# Patient Record
Sex: MALE | Race: OTHER | HISPANIC OR LATINO | Employment: FULL TIME | URBAN - METROPOLITAN AREA
[De-identification: names, ages, dates, MRNs, and addresses within clinical notes are randomized per-mention and may not be internally consistent; named-entity substitution may affect disease eponyms.]

---

## 2018-07-19 ENCOUNTER — APPOINTMENT (EMERGENCY)
Dept: RADIOLOGY | Facility: HOSPITAL | Age: 33
End: 2018-07-19
Payer: OTHER MISCELLANEOUS

## 2018-07-19 ENCOUNTER — HOSPITAL ENCOUNTER (EMERGENCY)
Facility: HOSPITAL | Age: 33
Discharge: HOME | End: 2018-07-20
Attending: EMERGENCY MEDICINE | Admitting: EMERGENCY MEDICINE
Payer: OTHER MISCELLANEOUS

## 2018-07-19 VITALS
TEMPERATURE: 98 F | HEART RATE: 93 BPM | WEIGHT: 310 LBS | OXYGEN SATURATION: 96 % | SYSTOLIC BLOOD PRESSURE: 139 MMHG | DIASTOLIC BLOOD PRESSURE: 87 MMHG | RESPIRATION RATE: 18 BRPM

## 2018-07-19 DIAGNOSIS — Z48.02 VISIT FOR SUTURE REMOVAL: ICD-10-CM

## 2018-07-19 DIAGNOSIS — Z51.89 VISIT FOR WOUND CHECK: Primary | ICD-10-CM

## 2018-07-19 LAB
ANION GAP SERPL CALCULATED.3IONS-SCNC: 3 MMOL/L (ref 4–13)
BASOPHILS # BLD AUTO: 0.05 THOUSANDS/ΜL (ref 0–0.1)
BASOPHILS NFR BLD AUTO: 1 % (ref 0–1)
BUN SERPL-MCNC: 11 MG/DL (ref 5–25)
CALCIUM SERPL-MCNC: 8.6 MG/DL (ref 8.3–10.1)
CHLORIDE SERPL-SCNC: 109 MMOL/L (ref 100–108)
CO2 SERPL-SCNC: 27 MMOL/L (ref 21–32)
CREAT SERPL-MCNC: 1.2 MG/DL (ref 0.6–1.3)
EOSINOPHIL # BLD AUTO: 0.18 THOUSAND/ΜL (ref 0–0.61)
EOSINOPHIL NFR BLD AUTO: 2 % (ref 0–6)
ERYTHROCYTE [DISTWIDTH] IN BLOOD BY AUTOMATED COUNT: 12.8 % (ref 11.6–15.1)
GFR SERPL CREATININE-BSD FRML MDRD: 80 ML/MIN/1.73SQ M
GLUCOSE SERPL-MCNC: 132 MG/DL (ref 65–140)
HCT VFR BLD AUTO: 45.3 % (ref 36.5–49.3)
HGB BLD-MCNC: 15.2 G/DL (ref 12–17)
IMM GRANULOCYTES # BLD AUTO: 0.08 THOUSAND/UL (ref 0–0.2)
IMM GRANULOCYTES NFR BLD AUTO: 1 % (ref 0–2)
LYMPHOCYTES # BLD AUTO: 3.66 THOUSANDS/ΜL (ref 0.6–4.47)
LYMPHOCYTES NFR BLD AUTO: 45 % (ref 14–44)
MCH RBC QN AUTO: 31.1 PG (ref 26.8–34.3)
MCHC RBC AUTO-ENTMCNC: 33.6 G/DL (ref 31.4–37.4)
MCV RBC AUTO: 93 FL (ref 82–98)
MONOCYTES # BLD AUTO: 0.61 THOUSAND/ΜL (ref 0.17–1.22)
MONOCYTES NFR BLD AUTO: 7 % (ref 4–12)
NEUTROPHILS # BLD AUTO: 3.63 THOUSANDS/ΜL (ref 1.85–7.62)
NEUTS SEG NFR BLD AUTO: 44 % (ref 43–75)
NRBC BLD AUTO-RTO: 0 /100 WBCS
PLATELET # BLD AUTO: 352 THOUSANDS/UL (ref 149–390)
PMV BLD AUTO: 9.9 FL (ref 8.9–12.7)
POTASSIUM SERPL-SCNC: 4.7 MMOL/L (ref 3.5–5.3)
RBC # BLD AUTO: 4.89 MILLION/UL (ref 3.88–5.62)
SODIUM SERPL-SCNC: 139 MMOL/L (ref 136–145)
WBC # BLD AUTO: 8.21 THOUSAND/UL (ref 4.31–10.16)

## 2018-07-19 PROCEDURE — 73201 CT UPPER EXTREMITY W/DYE: CPT

## 2018-07-19 PROCEDURE — 80048 BASIC METABOLIC PNL TOTAL CA: CPT | Performed by: EMERGENCY MEDICINE

## 2018-07-19 PROCEDURE — 85025 COMPLETE CBC W/AUTO DIFF WBC: CPT | Performed by: EMERGENCY MEDICINE

## 2018-07-19 PROCEDURE — 36415 COLL VENOUS BLD VENIPUNCTURE: CPT | Performed by: EMERGENCY MEDICINE

## 2018-07-19 RX ORDER — OXYCODONE HYDROCHLORIDE AND ACETAMINOPHEN 5; 325 MG/1; MG/1
1 TABLET ORAL ONCE
Status: COMPLETED | OUTPATIENT
Start: 2018-07-19 | End: 2018-07-19

## 2018-07-19 RX ADMIN — OXYCODONE HYDROCHLORIDE AND ACETAMINOPHEN 1 TABLET: 5; 325 TABLET ORAL at 23:09

## 2018-07-19 RX ADMIN — IOHEXOL 100 ML: 350 INJECTION, SOLUTION INTRAVENOUS at 23:59

## 2018-07-20 PROCEDURE — 99284 EMERGENCY DEPT VISIT MOD MDM: CPT

## 2018-07-20 NOTE — DISCHARGE INSTRUCTIONS
Follow-up with the primary care doctor in 24-48 hours for re-evaluation  Return with worsening pain, fevers any drainage or bleeding from the incision site  Today your sutures were removed as it had been 9 days since that replaced  Continue with antibiotics for the complete duration of previous recommendations  Continue Tylenol and ibuprofen for any mild to moderate discomfort  Laceration   WHAT YOU NEED TO KNOW:   A laceration is an injury to the skin and the soft tissue underneath it  Lacerations happen when you are cut or hit by something  They can happen anywhere on the body  DISCHARGE INSTRUCTIONS:   Return to the emergency department if:   · You have heavy bleeding or bleeding that does not stop after 10 minutes of holding firm, direct pressure over the wound       · Your wound opens up  Contact your healthcare provider if:   · You have a fever or chills       · Your laceration is red, warm, or swollen      · You have red streaks on your skin coming from your wound      · You have white or yellow drainage from the wound that smells bad      · You have pain that gets worse, even after treatment       · You have questions or concerns about your condition or care  Medicines:   · Prescription pain medicine may be given  Ask how to take this medicine safely       · Antibiotics help treat or prevent a bacterial infection       · Take your medicine as directed  Contact your healthcare provider if you think your medicine is not helping or if you have side effects  Tell him or her if you are allergic to any medicine  Keep a list of the medicines, vitamins, and herbs you take  Include the amounts, and when and why you take them  Bring the list or the pill bottles to follow-up visits  Carry your medicine list with you in case of an emergency  Care for your wound as directed:   · Do not get your wound wet until your healthcare provider says it is okay  Do not soak your wound in water   Do not go swimming until your healthcare provider says it is okay  Carefully wash the wound with soap and water  Gently pat the area dry or allow it to air dry       · Change your bandages when they get wet, dirty, or after washing  Apply new, clean bandages as directed  Do not apply elastic bandages or tape too tight  Do not put powders or lotions over your incision       · Apply antibiotic ointment as directed  Your healthcare provider may give you antibiotic ointment to put over your wound if you have stitches  If you have strips of tape over your incision, let them dry up and fall off on their own  If they do not fall off within 14 days, gently remove them  If you have glue over your wound, do not remove or pick at it  If your glue comes off, do not replace it with glue that you have at home       · Check your wound every day for signs of infection such as swelling, redness, or pus  Self-care:   · Apply ice on your wound for 15 to 20 minutes every hour or as directed  Use an ice pack, or put crushed ice in a plastic bag  Cover it with a towel  Ice helps prevent tissue damage and decreases swelling and pain      · Use a splint as directed  A splint will decrease movement and stress on your wound  It may help it heal faster  A splint may be used for lacerations over joints or areas of your body that bend  Ask your healthcare provider how to apply and remove a splint       · Decrease scarring of your wound by applying ointments as directed  Do not apply ointments until your healthcare provider says it is okay  You may need to wait until your wound is healed  Ask which ointment to buy and how often to use it  After your wound is healed, use sunscreen over the area when you are out in the sun  You should do this for at least 6 months to 1 year after your injury  Follow up with your healthcare provider as directed: You may need to follow up in 24 to 48 hours to have your wound checked for infection   You will need to return in 3 to 14 days if you have stitches or staples so they can be removed  Care for your wound as directed to prevent infection and help it heal  Write down your questions so you remember to ask them during your visits  © 2017 [employer] Information is for End User's use only and may not be sold, redistributed or otherwise used for commercial purposes  All illustrations and images included in CareNotes® are the copyrighted property of A D [employer] M [employer] Inc  or Oleg Pierce  The above information is an  only  It is not intended as medical advice for individual conditions or treatments   Talk to your doctor, nurse or pharmacist before following any medical regimen to see if it is safe and effective for you
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
## 2018-07-20 NOTE — ED ATTENDING ATTESTATION
Lauren Jacob MD, saw and evaluated the patient  I have discussed the patient with the resident/non-physician practitioner and agree with the resident's/non-physician practitioner's findings, Plan of Care, and MDM as documented in the resident's/non-physician practitioner's note, except where noted  All available labs and Radiology studies were reviewed  At this point I agree with the current assessment done in the Emergency Department  I have conducted an independent evaluation of this patient a history and physical is as follows:      Critical Care Time  CritCare Time    Procedures     35 yo male with 5 cm laceration from  on right forearm  Pt given abx  Pt with increased pain in forearm with erythema around incision  No fever  No pmh  Vss, afebrile, lungs cta, rrr, incision c/d/i, full rom and tenderness with palpation, pain out of proportion to exam   Labs, ct right arm, pain meds

## 2018-10-10 ENCOUNTER — APPOINTMENT (OUTPATIENT)
Dept: URGENT CARE | Age: 33
End: 2018-10-10
Payer: OTHER MISCELLANEOUS

## 2018-10-10 PROCEDURE — 99204 OFFICE O/P NEW MOD 45 MIN: CPT | Performed by: PREVENTIVE MEDICINE

## 2019-09-07 ENCOUNTER — APPOINTMENT (EMERGENCY)
Dept: RADIOLOGY | Facility: HOSPITAL | Age: 34
End: 2019-09-07
Payer: OTHER MISCELLANEOUS

## 2019-09-07 ENCOUNTER — HOSPITAL ENCOUNTER (EMERGENCY)
Facility: HOSPITAL | Age: 34
Discharge: HOME | End: 2019-09-08
Attending: EMERGENCY MEDICINE
Payer: OTHER MISCELLANEOUS

## 2019-09-07 VITALS
HEART RATE: 64 BPM | TEMPERATURE: 96.3 F | DIASTOLIC BLOOD PRESSURE: 69 MMHG | RESPIRATION RATE: 18 BRPM | OXYGEN SATURATION: 96 % | SYSTOLIC BLOOD PRESSURE: 111 MMHG

## 2019-09-07 DIAGNOSIS — S62.322A CLOSED DISPLACED FRACTURE OF SHAFT OF THIRD METACARPAL BONE OF RIGHT HAND, INITIAL ENCOUNTER: Primary | ICD-10-CM

## 2019-09-07 PROCEDURE — 99283 EMERGENCY DEPT VISIT LOW MDM: CPT | Performed by: EMERGENCY MEDICINE

## 2019-09-07 PROCEDURE — 73130 X-RAY EXAM OF HAND: CPT

## 2019-09-07 PROCEDURE — 99283 EMERGENCY DEPT VISIT LOW MDM: CPT

## 2019-09-07 RX ORDER — OXYCODONE HYDROCHLORIDE AND ACETAMINOPHEN 5; 325 MG/1; MG/1
1 TABLET ORAL ONCE
Status: COMPLETED | OUTPATIENT
Start: 2019-09-07 | End: 2019-09-07

## 2019-09-07 RX ADMIN — OXYCODONE HYDROCHLORIDE AND ACETAMINOPHEN 1 TABLET: 5; 325 TABLET ORAL at 23:46

## 2019-09-08 RX ORDER — NAPROXEN 500 MG/1
500 TABLET ORAL 2 TIMES DAILY WITH MEALS
Qty: 30 TABLET | Refills: 0 | Status: SHIPPED | OUTPATIENT
Start: 2019-09-08

## 2019-09-08 RX ORDER — OXYCODONE HYDROCHLORIDE AND ACETAMINOPHEN 5; 325 MG/1; MG/1
1 TABLET ORAL EVERY 4 HOURS PRN
Qty: 12 TABLET | Refills: 0 | Status: SHIPPED | OUTPATIENT
Start: 2019-09-08 | End: 2019-09-18
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
## 2019-09-10 NOTE — TELEPHONE ENCOUNTER
Call from patient   Call back # [phone]  Workers comp     I sent a message to Shy for an appt  Marine Somers

## 2019-09-10 NOTE — TELEPHONE ENCOUNTER
I called patient and left a voicemail to inform them of their appointment with Dr Lance Reynolds on Friday 9/13/19 at 1:45pm in our [place] location, Entrance A  When/if the patient calls back, please relay the message above  If this time and date does not work for the patient, please email Teto Elaine       Thank you

## 2019-09-16 ENCOUNTER — HOSPITAL ENCOUNTER (OUTPATIENT)
Dept: RADIOLOGY | Facility: HOSPITAL | Age: 34
Discharge: HOME | End: 2019-09-16
Payer: OTHER MISCELLANEOUS

## 2019-09-16 ENCOUNTER — OFFICE VISIT (OUTPATIENT)
Dept: OBGYN CLINIC | Facility: HOSPITAL | Age: 34
End: 2019-09-16
Payer: OTHER MISCELLANEOUS

## 2019-09-16 VITALS
DIASTOLIC BLOOD PRESSURE: 112 MMHG | HEART RATE: 62 BPM | SYSTOLIC BLOOD PRESSURE: 163 MMHG | WEIGHT: 279 LBS | BODY MASS INDEX: 39.06 KG/M2 | HEIGHT: 71 IN

## 2019-09-16 DIAGNOSIS — M79.641 RIGHT HAND PAIN: ICD-10-CM

## 2019-09-16 DIAGNOSIS — M79.641 RIGHT HAND PAIN: Primary | ICD-10-CM

## 2019-09-16 DIAGNOSIS — S62.322A CLOSED DISPLACED FRACTURE OF SHAFT OF THIRD METACARPAL BONE OF RIGHT HAND, INITIAL ENCOUNTER: ICD-10-CM

## 2019-09-16 PROCEDURE — 73130 X-RAY EXAM OF HAND: CPT

## 2019-09-16 PROCEDURE — 99203 OFFICE O/P NEW LOW 30 MIN: CPT | Performed by: PHYSICIAN ASSISTANT
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
## 2019-09-16 NOTE — PATIENT INSTRUCTIONS
Ice Pack Application   WHAT YOU NEED TO KNOW:   Ice can be used to decrease swelling and pain after an injury or surgery  Common injuries that may benefit from ice therapy are sprains, strains, and bruises  The use of ice is most effective in the first 1 to 3 days after an injury  DISCHARGE INSTRUCTIONS:   How to apply ice:   · Fill a bag with crushed ice about half full  Remove the air from the bag before you close it  You can also use a bag of frozen vegetables  · Wrap the ice pack in a cloth to protect your skin from frostbite or other injury  · Put the ice over the injured area for 20 to 30 minutes or as long as directed  · Check your skin after about 30 seconds for color changes or blistering  Remove the ice if you notice skin changes or you feel burning or numbness in the area  · Throw the ice pack away after use  · Apply ice to your injured area 4 times each day or as directed  Ask your healthcare provider how many days you should apply ice  Contact your healthcare provider if:   · You see blisters, whitening of your skin, or a bluish color to your skin after using ice  · You feel burning or numbness when using ice  · You have questions about the use of ice packs  © 2017 [address] [facility] Information is for End User's use only and may not be sold, redistributed or otherwise used for commercial purposes  All illustrations and images included in CareNotes® are the copyrighted property of [employer]  or Oleg Pierce  The above information is an  only  It is not intended as medical advice for individual conditions or treatments  Talk to your doctor, nurse or pharmacist before following any medical regimen to see if it is safe and effective for you  Safe Use of NSAIDs   WHAT YOU NEED TO KNOW:   NSAIDs are medicines that are used to decrease pain, swelling, and fever  NSAIDs are available with or without a doctor's order   NSAIDs that you can buy without a doctor's order include aspirin, ibuprofen, and naproxen  DISCHARGE INSTRUCTIONS:   Return to the emergency department if:   · You have swelling around your mouth or trouble breathing  · You are breathing fast or you have a fast heartbeat  · You have nausea, vomiting, or abdominal pain  · You have blood in your vomit or bowel movements  · You have a seizure  Contact your healthcare provider if:   · You have a headache or become confused  · You develop hearing loss or ringing in your ears  · You develop itching, a rash, or hives  · You have swelling around your lower legs, feet, ankles, and hands  · You do not know how much NSAIDs to give to your child  · You have questions or concerns about your condition or care  How to give NSAIDs to your child safely:   · Read the directions on the label  Find out if the medicine is right for your child's age and how much to give to your child  The dose for your child's weight or age should be listed  Do not  give your child more than the recommended amount  · Use the measuring tool that came with the medicine  Do not  use another measuring tool, such as a kitchen spoon  Other measuring tools do not provide the right amount of medicine  How to take NSAIDs safely:   · Read the directions on the label to learn how much medicine you should take and often to take it  Do not take more than the recommended amount  · Talk to your healthcare provider if you need take NSAIDs for more than 30 days  The longer you take NSAIDs, the higher your risk of side effects will be  You may need to take other medicines to decrease your risk of side effects such as stomach bleeding  · Do not take an over-the-counter NSAIDs with prescription NSAIDs  The combined amount of NSAIDs may be too high  · Tell your healthcare provider about other medicines you take  Some medicines can increase the risk of side effects from NSAIDs   Your healthcare provider will tell you if it is okay to take NSAIDs and how to take them  Who should not take NSAIDs:  Certain people should avoid or limit NSAIDs  Do not  give NSAIDs to children under 10months of age without direction from your child's doctor  Do not give aspirin to children under 25years of age  Your child could develop Reye syndrome if he takes aspirin  Reye syndrome can cause life-threatening brain and liver damage  Check your child's medicine labels for aspirin, salicylates, or oil of wintergreen  Talk to your healthcare provider before you take NSAIDs if any of the following apply to you:  · You have reflux disease, a peptic ulcer, H pylori infection, or bleeding in your stomach or intestines  · You have a bleeding disorder, or you take blood-thinning medicine  · You are allergic to aspirin or other NSAIDs  · You have liver or kidney or disease  · You have high blood pressure or heart disease  · You have 3 or more alcoholic drinks each day  · You are pregnant  What you need to know about an NSAID overdose:  Certain health problems can occur if you take too much NSAID medicine at one time or over time  Problems include nausea, vomiting, and abdominal pain  You may develop gastritis, peptic ulcers, and stomach bleeding  You may also develop fluid retention, heart problems, and kidney problems  NSAIDs can worsen high blood pressure  You may become confused, or you may have a headache, hearing loss, or hallucinations  An overdose of aspirin may also cause rapid breathing, a rapid heartbeat, or seizures  What to do if you think you or your child took too much NSAID medicine:  Call the [facility] at 1-254.766.9319 immediately  © 2017 [address] [employer]  Information is for End User's use only and may not be sold, redistributed or otherwise used for commercial purposes   All illustrations and images included in CareNotes® are the copyrighted property of EB[employer] PICHARD[employer]isel[employer]  or [employer]leg Pierce  The above information is an  only  It is not intended as medical advice for individual conditions or treatments  Talk to your doctor, nurse or pharmacist before following any medical regimen to see if it is safe and effective for you

## 2019-09-18 ENCOUNTER — OFFICE VISIT (OUTPATIENT)
Dept: OBGYN CLINIC | Facility: CLINIC | Age: 34
End: 2019-09-18
Payer: OTHER MISCELLANEOUS

## 2019-09-18 ENCOUNTER — ANESTHESIA EVENT (OUTPATIENT)
Dept: PERIOP | Facility: AMBULARY SURGERY CENTER | Age: 34
End: 2019-09-18
Payer: OTHER MISCELLANEOUS

## 2019-09-18 VITALS
SYSTOLIC BLOOD PRESSURE: 104 MMHG | HEIGHT: 71 IN | BODY MASS INDEX: 38.78 KG/M2 | HEART RATE: 59 BPM | WEIGHT: 277 LBS | DIASTOLIC BLOOD PRESSURE: 69 MMHG

## 2019-09-18 DIAGNOSIS — S62.322A CLOSED DISPLACED FRACTURE OF SHAFT OF THIRD METACARPAL BONE OF RIGHT HAND, INITIAL ENCOUNTER: Primary | ICD-10-CM

## 2019-09-18 PROCEDURE — 99213 OFFICE O/P EST LOW 20 MIN: CPT | Performed by: SURGERY
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
## 2019-09-20 ENCOUNTER — APPOINTMENT (OUTPATIENT)
Dept: PREADMISSION TESTING | Facility: HOSPITAL | Age: 34
End: 2019-09-20
Payer: OTHER MISCELLANEOUS

## 2019-09-20 RX ORDER — OXYCODONE HYDROCHLORIDE AND ACETAMINOPHEN 5; 325 MG/1; MG/1
1 TABLET ORAL EVERY 4 HOURS PRN
COMMUNITY
End: 2019-09-24 | Stop reason: HOSPADM

## 2019-09-20 NOTE — PRE-PROCEDURE INSTRUCTIONS
Pre-Surgery Instructions:   Medication Instructions    naproxen (NAPROSYN) 500 mg tablet Patient was instructed by Physician and understands   oxyCODONE-acetaminophen (PERCOCET) 5-325 mg per tablet Instructed patient per Anesthesia Guidelines  Pre op and bathing instructions reviewed  Pt has hibiclens

## 2019-09-24 ENCOUNTER — ANESTHESIA (OUTPATIENT)
Dept: PERIOP | Facility: AMBULARY SURGERY CENTER | Age: 34
End: 2019-09-24
Payer: OTHER MISCELLANEOUS

## 2019-09-24 ENCOUNTER — HOSPITAL ENCOUNTER (OUTPATIENT)
Facility: AMBULARY SURGERY CENTER | Age: 34
Setting detail: OUTPATIENT SURGERY
Discharge: HOME | End: 2019-09-24
Attending: SURGERY | Admitting: SURGERY
Payer: OTHER MISCELLANEOUS

## 2019-09-24 ENCOUNTER — APPOINTMENT (OUTPATIENT)
Dept: RADIOLOGY | Facility: AMBULARY SURGERY CENTER | Age: 34
End: 2019-09-24
Payer: OTHER MISCELLANEOUS

## 2019-09-24 VITALS
WEIGHT: 280 LBS | OXYGEN SATURATION: 97 % | DIASTOLIC BLOOD PRESSURE: 76 MMHG | HEIGHT: 71 IN | TEMPERATURE: 98 F | HEART RATE: 77 BPM | SYSTOLIC BLOOD PRESSURE: 124 MMHG | BODY MASS INDEX: 39.2 KG/M2 | RESPIRATION RATE: 16 BRPM

## 2019-09-24 DIAGNOSIS — S62.322D CLOSED DISPLACED FRACTURE OF SHAFT OF THIRD METACARPAL BONE OF RIGHT HAND WITH ROUTINE HEALING, SUBSEQUENT ENCOUNTER: ICD-10-CM

## 2019-09-24 DIAGNOSIS — Z47.89 AFTERCARE FOLLOWING SURGERY OF THE MUSCULOSKELETAL SYSTEM: Primary | ICD-10-CM

## 2019-09-24 PROCEDURE — C1713 ANCHOR/SCREW BN/BN,TIS/BN: HCPCS | Performed by: SURGERY

## 2019-09-24 PROCEDURE — 73120 X-RAY EXAM OF HAND: CPT

## 2019-09-24 PROCEDURE — 26615 TREAT METACARPAL FRACTURE: CPT | Performed by: PHYSICIAN ASSISTANT

## 2019-09-24 PROCEDURE — 26615 TREAT METACARPAL FRACTURE: CPT | Performed by: SURGERY

## 2019-09-24 DEVICE — 2.0 CORTICAL SCREW 13MM, HD6, 1/PKG
Type: IMPLANTABLE DEVICE | Site: HAND | Status: FUNCTIONAL
Brand: APTUS

## 2019-09-24 DEVICE — 2.0 CORTICAL SCREW 11MM, HD6, 1/PKG
Type: IMPLANTABLE DEVICE | Site: HAND | Status: FUNCTIONAL
Brand: APTUS

## 2019-09-24 DEVICE — 2.0 CORTICAL SCREW 15MM, HD6, 1/PKG
Type: IMPLANTABLE DEVICE | Site: HAND | Status: FUNCTIONAL
Brand: APTUS

## 2019-09-24 DEVICE — WIRE OLIVE 1.6 X 150MM: Type: IMPLANTABLE DEVICE | Site: HAND | Status: FUNCTIONAL

## 2019-09-24 DEVICE — 2.0 TRILOCK SCREW 13MM, HD6, 1/PKG
Type: IMPLANTABLE DEVICE | Site: HAND | Status: FUNCTIONAL
Brand: APTUS

## 2019-09-24 DEVICE — 2.0/2.3 TRILOCK PL 8 HOLE STRAIGHT, T1.3
Type: IMPLANTABLE DEVICE | Site: HAND | Status: FUNCTIONAL
Brand: APTUS

## 2019-09-24 DEVICE — 2.0 CORTICAL SCREW 16MM, HD6, 1/PKG
Type: IMPLANTABLE DEVICE | Site: HAND | Status: FUNCTIONAL
Brand: APTUS

## 2019-09-24 RX ORDER — ROPIVACAINE HYDROCHLORIDE 5 MG/ML
INJECTION, SOLUTION EPIDURAL; INFILTRATION; PERINEURAL AS NEEDED
Status: DISCONTINUED | OUTPATIENT
Start: 2019-09-24 | End: 2019-09-24 | Stop reason: SURG

## 2019-09-24 RX ORDER — KETAMINE HYDROCHLORIDE 50 MG/ML
INJECTION, SOLUTION, CONCENTRATE INTRAMUSCULAR; INTRAVENOUS AS NEEDED
Status: DISCONTINUED | OUTPATIENT
Start: 2019-09-24 | End: 2019-09-24 | Stop reason: SURG

## 2019-09-24 RX ORDER — HYDROCODONE BITARTRATE AND ACETAMINOPHEN 5; 325 MG/1; MG/1
1 TABLET ORAL EVERY 6 HOURS PRN
Qty: 20 TABLET | Refills: 0 | Status: SHIPPED | OUTPATIENT
Start: 2019-09-24 | End: 2019-10-04

## 2019-09-24 RX ORDER — SODIUM CHLORIDE, SODIUM LACTATE, POTASSIUM CHLORIDE, CALCIUM CHLORIDE 600; 310; 30; 20 MG/100ML; MG/100ML; MG/100ML; MG/100ML
125 INJECTION, SOLUTION INTRAVENOUS CONTINUOUS
Status: DISCONTINUED | OUTPATIENT
Start: 2019-09-24 | End: 2019-09-24 | Stop reason: HOSPADM

## 2019-09-24 RX ORDER — ONDANSETRON 2 MG/ML
INJECTION INTRAMUSCULAR; INTRAVENOUS AS NEEDED
Status: DISCONTINUED | OUTPATIENT
Start: 2019-09-24 | End: 2019-09-24 | Stop reason: SURG

## 2019-09-24 RX ORDER — SODIUM CHLORIDE, SODIUM LACTATE, POTASSIUM CHLORIDE, CALCIUM CHLORIDE 600; 310; 30; 20 MG/100ML; MG/100ML; MG/100ML; MG/100ML
20 INJECTION, SOLUTION INTRAVENOUS CONTINUOUS
Status: DISCONTINUED | OUTPATIENT
Start: 2019-09-24 | End: 2019-09-24 | Stop reason: HOSPADM

## 2019-09-24 RX ORDER — CEFAZOLIN SODIUM 2 G/50ML
2000 SOLUTION INTRAVENOUS ONCE
Status: DISCONTINUED | OUTPATIENT
Start: 2019-09-24 | End: 2019-09-24 | Stop reason: HOSPADM

## 2019-09-24 RX ORDER — LIDOCAINE HYDROCHLORIDE 10 MG/ML
INJECTION, SOLUTION EPIDURAL; INFILTRATION; INTRACAUDAL; PERINEURAL AS NEEDED
Status: DISCONTINUED | OUTPATIENT
Start: 2019-09-24 | End: 2019-09-24 | Stop reason: SURG

## 2019-09-24 RX ORDER — ACETAMINOPHEN 325 MG/1
650 TABLET ORAL EVERY 6 HOURS PRN
Status: DISCONTINUED | OUTPATIENT
Start: 2019-09-24 | End: 2019-09-24 | Stop reason: HOSPADM

## 2019-09-24 RX ORDER — ONDANSETRON 2 MG/ML
4 INJECTION INTRAMUSCULAR; INTRAVENOUS ONCE AS NEEDED
Status: DISCONTINUED | OUTPATIENT
Start: 2019-09-24 | End: 2019-09-24 | Stop reason: HOSPADM

## 2019-09-24 RX ORDER — FENTANYL CITRATE 50 UG/ML
INJECTION, SOLUTION INTRAMUSCULAR; INTRAVENOUS AS NEEDED
Status: DISCONTINUED | OUTPATIENT
Start: 2019-09-24 | End: 2019-09-24 | Stop reason: SURG

## 2019-09-24 RX ORDER — MAGNESIUM HYDROXIDE 1200 MG/15ML
LIQUID ORAL AS NEEDED
Status: DISCONTINUED | OUTPATIENT
Start: 2019-09-24 | End: 2019-09-24 | Stop reason: HOSPADM

## 2019-09-24 RX ORDER — ONDANSETRON 2 MG/ML
4 INJECTION INTRAMUSCULAR; INTRAVENOUS EVERY 6 HOURS PRN
Status: DISCONTINUED | OUTPATIENT
Start: 2019-09-24 | End: 2019-09-24 | Stop reason: HOSPADM

## 2019-09-24 RX ORDER — DEXAMETHASONE SODIUM PHOSPHATE 4 MG/ML
INJECTION, SOLUTION INTRA-ARTICULAR; INTRALESIONAL; INTRAMUSCULAR; INTRAVENOUS; SOFT TISSUE AS NEEDED
Status: DISCONTINUED | OUTPATIENT
Start: 2019-09-24 | End: 2019-09-24 | Stop reason: SURG

## 2019-09-24 RX ORDER — LIDOCAINE HYDROCHLORIDE 10 MG/ML
INJECTION, SOLUTION INFILTRATION; PERINEURAL AS NEEDED
Status: DISCONTINUED | OUTPATIENT
Start: 2019-09-24 | End: 2019-09-24 | Stop reason: SURG

## 2019-09-24 RX ORDER — PROPOFOL 10 MG/ML
INJECTION, EMULSION INTRAVENOUS CONTINUOUS PRN
Status: DISCONTINUED | OUTPATIENT
Start: 2019-09-24 | End: 2019-09-24 | Stop reason: SURG

## 2019-09-24 RX ORDER — MIDAZOLAM HYDROCHLORIDE 1 MG/ML
INJECTION INTRAMUSCULAR; INTRAVENOUS AS NEEDED
Status: DISCONTINUED | OUTPATIENT
Start: 2019-09-24 | End: 2019-09-24 | Stop reason: SURG

## 2019-09-24 RX ORDER — PROPOFOL 10 MG/ML
INJECTION, EMULSION INTRAVENOUS AS NEEDED
Status: DISCONTINUED | OUTPATIENT
Start: 2019-09-24 | End: 2019-09-24 | Stop reason: SURG

## 2019-09-24 RX ORDER — CEFAZOLIN SODIUM 1 G/50ML
1000 SOLUTION INTRAVENOUS ONCE
Status: DISCONTINUED | OUTPATIENT
Start: 2019-09-24 | End: 2019-09-24 | Stop reason: HOSPADM

## 2019-09-24 RX ORDER — FENTANYL CITRATE/PF 50 MCG/ML
50 SYRINGE (ML) INJECTION
Status: DISCONTINUED | OUTPATIENT
Start: 2019-09-24 | End: 2019-09-24 | Stop reason: HOSPADM

## 2019-09-24 RX ADMIN — PROPOFOL 50 MCG/KG/MIN: 10 INJECTION, EMULSION INTRAVENOUS at 11:45

## 2019-09-24 RX ADMIN — ROPIVACAINE HYDROCHLORIDE 25 ML: 5 INJECTION, SOLUTION EPIDURAL; INFILTRATION; PERINEURAL at 11:15

## 2019-09-24 RX ADMIN — LIDOCAINE HYDROCHLORIDE 5 ML: 10 INJECTION, SOLUTION EPIDURAL; INFILTRATION; INTRACAUDAL; PERINEURAL at 11:15

## 2019-09-24 RX ADMIN — PROPOFOL 100 MG: 10 INJECTION, EMULSION INTRAVENOUS at 11:55

## 2019-09-24 RX ADMIN — PROPOFOL 40 MG: 10 INJECTION, EMULSION INTRAVENOUS at 11:47

## 2019-09-24 RX ADMIN — PROPOFOL 300 MG: 10 INJECTION, EMULSION INTRAVENOUS at 12:13

## 2019-09-24 RX ADMIN — MIDAZOLAM HYDROCHLORIDE 2 MG: 1 INJECTION, SOLUTION INTRAMUSCULAR; INTRAVENOUS at 11:10

## 2019-09-24 RX ADMIN — DEXAMETHASONE SODIUM PHOSPHATE 4 MG: 4 INJECTION, SOLUTION INTRAMUSCULAR; INTRAVENOUS at 12:21

## 2019-09-24 RX ADMIN — PROPOFOL 50 MG: 10 INJECTION, EMULSION INTRAVENOUS at 11:44

## 2019-09-24 RX ADMIN — KETAMINE HYDROCHLORIDE 10 MG: 50 INJECTION INTRAMUSCULAR; INTRAVENOUS at 12:03

## 2019-09-24 RX ADMIN — Medication 3000 MG: at 11:40

## 2019-09-24 RX ADMIN — LIDOCAINE HYDROCHLORIDE 50 MG: 10 INJECTION, SOLUTION INFILTRATION; PERINEURAL at 11:44

## 2019-09-24 RX ADMIN — FENTANYL CITRATE 50 MCG: 50 INJECTION, SOLUTION INTRAMUSCULAR; INTRAVENOUS at 12:00

## 2019-09-24 RX ADMIN — FENTANYL CITRATE 50 MCG: 50 INJECTION, SOLUTION INTRAMUSCULAR; INTRAVENOUS at 11:52

## 2019-09-24 RX ADMIN — SODIUM CHLORIDE, SODIUM LACTATE, POTASSIUM CHLORIDE, AND CALCIUM CHLORIDE: .6; .31; .03; .02 INJECTION, SOLUTION INTRAVENOUS at 11:00

## 2019-09-24 RX ADMIN — FENTANYL CITRATE 100 MCG: 50 INJECTION, SOLUTION INTRAMUSCULAR; INTRAVENOUS at 11:10

## 2019-09-24 RX ADMIN — LIDOCAINE HYDROCHLORIDE 50 MG: 10 INJECTION, SOLUTION INFILTRATION; PERINEURAL at 11:52

## 2019-09-24 RX ADMIN — ONDANSETRON 4 MG: 2 INJECTION INTRAMUSCULAR; INTRAVENOUS at 12:48

## 2019-09-24 RX ADMIN — KETAMINE HYDROCHLORIDE 10 MG: 50 INJECTION INTRAMUSCULAR; INTRAVENOUS at 11:44

## 2019-09-24 RX ADMIN — MIDAZOLAM HYDROCHLORIDE 2 MG: 1 INJECTION, SOLUTION INTRAMUSCULAR; INTRAVENOUS at 11:52

## 2019-09-24 NOTE — INTERVAL H&P NOTE
H&P reviewed  After examining the patient I find no changes in the patients condition since the H&P had been written      Vitals:    09/24/19 1055   BP: 144/94   Pulse: 61   Resp: 18   Temp: (!) 97 2 °F (36 2 °C)   SpO2: 98%

## 2019-09-24 NOTE — ANESTHESIA POSTPROCEDURE EVALUATION
Post-Op Assessment Note    CV Status:  Stable       Mental Status:  Sleepy   Hydration Status:  Stable   PONV Controlled:  Controlled   Airway Patency:  Patent   Post Op Vitals Reviewed: Yes      Staff: CRNA           BP      Temp     Pulse     Resp      SpO2

## 2019-09-24 NOTE — ANESTHESIA PROCEDURE NOTES
Peripheral Block    Patient location during procedure: holding area  Start time: 9/24/2019 11:15 AM  Reason for block: at surgeon's request and post-op pain management  Staffing  Anesthesiologist: Brittny Person MD  Performed: anesthesiologist   Preanesthetic Checklist  Completed: patient identified, site marked, surgical consent, pre-op evaluation, timeout performed, IV checked, risks and benefits discussed and monitors and equipment checked  Peripheral Block  Patient position: supine  Prep: ChloraPrep  Patient monitoring: continuous pulse ox  Block type: supraclavicular  Laterality: right  Injection technique: single-shot  Procedures: ultrasound guided, Ultrasound guidance required for the procedure to increase accuracy and safety of medication placement and decrease risk of complications  Needle  Needle type: Stimuplex   Needle gauge: 22 G  Needle length: 5 cm  Needle localization: anatomical landmarks and ultrasound guidance  Test dose: negative  Assessment  Injection assessment: incremental injection, local visualized surrounding nerve on ultrasound, negative aspiration for heme and no paresthesia on injection  Paresthesia pain: none  Heart rate change: no  patient tolerated the procedure well with no immediate complications  Additional Notes  No pain/paresthesia with injection of LA  Good LA spread visualized under u/s guidance  No apparent complications

## 2019-09-24 NOTE — ANESTHESIA PREPROCEDURE EVALUATION
Review of Systems/Medical History  Patient summary reviewed        Cardiovascular  Negative cardio ROS    Pulmonary  Smoker cigarette smoker  ,   Comment: +THC daily     GI/Hepatic  Negative GI/hepatic ROS          Negative  ROS        Endo/Other  Negative endo/other ROS      GYN  Negative gynecology ROS          Hematology  Negative hematology ROS      Musculoskeletal    Comment: Right hand fracture      Neurology  Negative neurology ROS      Psychology   Negative psychology ROS              Physical Exam    Airway    Mallampati score: III  TM Distance: >3 FB  Neck ROM: full     Dental   No notable dental hx     Cardiovascular  Comment: Negative ROS,     Pulmonary      Other Findings        Anesthesia Plan  ASA Score- 2     Anesthesia Type- IV sedation with anesthesia and regional with ASA Monitors  Additional Monitors:   Airway Plan:     Comment: Right supraclavicular block  Plan Factors-    Induction- intravenous  Postoperative Plan- Plan for postoperative opioid use  Informed Consent- Anesthetic plan and risks discussed with patient  I personally reviewed this patient with the CRNA  Discussed and agreed on the Anesthesia Plan with the CRNA  Arlene Mcdonald

## 2019-09-24 NOTE — OP NOTE
OPERATIVE REPORT  PATIENT NAME: Darryle Cosier  :  1985  MRN: [id number]  Pt Location: [facility]    SURGERY DATE: 19    Surgeon(s) and Role:     * Mariam Goldberg, MD - Primary     * Dahlia Vegas PA-C - Assisting    Pre-Op Diagnosis:  Closed displaced fracture of shaft of third metacarpal bone of right hand, initial encounter Joseluis Keller    Post-Op Diagnosis Codes:     * Closed displaced fracture of shaft of third metacarpal bone of right hand, initial encounter [S62 322A]    Procedure(s):  HAND OPEN REDUCTION W/ INTERNAL FIXATION (ORIF) (Right) - metacarpal shaft     Specimen(s):  * No orders in the log *    Estimated Blood Loss:   Minimal    Anesthesia Type:   Conscious Sedation     IMPLANTS:  Implant Name Type Inv  Item Serial No   Lot No  LRB No  Used   LOG [id number] - [employer] MODULAR MINI FRAG LCP SYSTEM - 1          LOG [id number] - [employer] MODULAR HAND SYSTEM TITANIUM IMPLANTS AND INSTRUMENTS - 1          drill bit (2 0) 1 [id number] AO fixation      Right 1   drill bit (2 0) 2 [id number] AO fixation      Right 1   k wire 1 6 olive wire      Right 2   Countersink f/fix screws 2 0/2 3      Right 1   jaleel 2 0-2 3 locking plate 8 hole curve      Right 1   gold 2 0 cortex bone screw 11mm      Right 1   gold 2 0 cortex bond screw 13mm      Right 1   gold 2 0 cortex bone screw      Right 1   gold 2 0 cortex bone screw 14 mm      Right 2   gold 2 0 cortex bone screw 15mm      Right 1   gold 2 0 cortex bone screw 16mm      Right 1   blue 2 0 locking screws 13mm      Right 1       PERIOPERATIVE ANTIBIOTICS:    cefazolin, 3 grams    Tourniquet Time: 56 min at 250 mmHg          Operative Indications: The patient has a history of right long finger metacarpal fracture with a extensor lag that was recalcitrant to conservative management    The decision was made to bring the patient to the operating room for right long finger open reduction internal fixation of 3rd metacarpal  Risks of the procedure were explained which include, but are not limited to bleeding; infection; damage to nerves, arteries,veins, tendons; scar; pain; need for reoperation; failure to give desired result; and risks of anaesthesia  All questions were answered to satisfaction and they were willing to proceed  Operative Findings:  Significant healing at the fracture site with a slight callus formation    Complications:   None    Procedure and Technique:  After the patient, site, and procedure were identified, the patient was brought into the operating room in a supine position  Regional and general anaesthesia were provided  A well padded tourniquet was applied to the extremity, set at 250 mmHg  The  right upper extremity was then prepped and drapped in a normal, sterile, orthopedic fashion  A 6  cm longitudinal incision was made overlying the third  metacarpal   Under loupe magnification branches of the radial dorsal sensory nerve were  identified and protected as were the superficial arteries and veins  The EDC tendon and was split longitudinally  Using the 64 blade the fascia overlying the 3rd metacarpal was split and using a a Key elevator the periosteal flaps were raised  The fracture site, was identified irrigated, and debrided sharply using a curette and a 64 blade  Comminution was noted as was significant nascent callus formation Next the fracture was reduced using longitudinal traction as well as a point-to-point clamp across the fracture side  AP lateral oblique radiographs were taken to verify appropriate reduction  Using a lag by technique a lag screw was placed across the fracture site providing good compression at the fracture site  Reduction was verified on orthogonal views and demonstrated a near anatomic reduction  Next a 8  hole 2 0 mm plate was applied to the dorsal aspect of the metacarpal and pinned in place  Fluoroscopy was used to verify appropriate position   Distal and proximal screws were sequentially drilled measured and placed alternating between the proximal distal fragment while maintaining compression at the fracture site  6 screws were placed in total 3 proximal to the fracture site and 3  distal    Fracture reduction as well as hardware position and screw length was verified on AP lateral oblique radiographs and was found to be near anatomic with good hardware position  The wound was irrigated  The periosteum closed with a 4 0 Monocryl  Extensor tendon was repaired using a running 4 Monocryl stitch         At the completion of the procedure, hemostasis was obtained with cautery and direct pressure  The wounds were copiously irrigated with sterile solution  The wounds were closed with Monocryl, Histocryl and Steri-strips  Sterile dressings were applied, including Xeroform, gauze, tweeners, webril, ACE and Volar Splint  Please note, all sponge, needle, and instrument counts were correct prior to closure  Loupe magnification was utilized  The patient tolerated the procedure well       I was present for the entire procedure, A qualified resident physician was not available and A physician assistant was required during the procedure for retraction tissue handling,dissection and suturing    Patient Disposition:  PACU     SIGNATURE: Kristy Lewis MD  DATE: 09/24/19  TIME: 1:22 PM

## 2019-09-24 NOTE — DISCHARGE INSTRUCTIONS
You have had surgery on your arm today, please read and follow the information below   Elevate your hand above your elbow during the next 24-48 hours to help with swelling   Place your hand and arm over your head with motion at your shoulder three times a day   Do not apply any cream/ointment/oil to your incisions including antibiotics   Do not soak your hands in standing water (dishwater, tubs, Jacuzzis, pools, etc) until given permission (typically 2-3 weeks after injury)  Call the office if you notice any:   Increased numbness or tingling of you hand or fingers that is not relieved with elevation   Increasing pain that is not controlled with medication   Difficulty chewing, breathing, swallowing   Chest pains or shortness of breath   Fever over 101 4 degrees    Additional Information  Do not remove your dressings until follow-up  Move your fingers 10 times per hour  Do not move any splinted fingers  , Ice to area 15 minutes each hour for 24 hours  , Sling for comfort for the next 2-3 days  You may stop using the sling when you are comfortable  For pain, please see your prescription for Norco, which should be taken as 1 tab, every 6 hours as needed  Please arrange for a follow-up in 10-14 days

## 2019-10-07 ENCOUNTER — OFFICE VISIT (OUTPATIENT)
Dept: OCCUPATIONAL THERAPY | Facility: CLINIC | Age: 34
End: 2019-10-07
Payer: OTHER MISCELLANEOUS

## 2019-10-07 ENCOUNTER — APPOINTMENT (OUTPATIENT)
Dept: RADIOLOGY | Facility: AMBULARY SURGERY CENTER | Age: 34
End: 2019-10-07
Attending: SURGERY
Payer: OTHER MISCELLANEOUS

## 2019-10-07 ENCOUNTER — OFFICE VISIT (OUTPATIENT)
Dept: OBGYN CLINIC | Facility: CLINIC | Age: 34
End: 2019-10-07

## 2019-10-07 VITALS
WEIGHT: 275 LBS | BODY MASS INDEX: 38.5 KG/M2 | HEART RATE: 80 BPM | SYSTOLIC BLOOD PRESSURE: 136 MMHG | DIASTOLIC BLOOD PRESSURE: 85 MMHG | HEIGHT: 71 IN

## 2019-10-07 DIAGNOSIS — Z98.890 S/P ORIF (OPEN REDUCTION INTERNAL FIXATION) FRACTURE: ICD-10-CM

## 2019-10-07 DIAGNOSIS — S62.322A CLOSED DISPLACED FRACTURE OF SHAFT OF THIRD METACARPAL BONE OF RIGHT HAND, INITIAL ENCOUNTER: Primary | ICD-10-CM

## 2019-10-07 DIAGNOSIS — Z87.81 S/P ORIF (OPEN REDUCTION INTERNAL FIXATION) FRACTURE: ICD-10-CM

## 2019-10-07 DIAGNOSIS — S62.322A CLOSED DISPLACED FRACTURE OF SHAFT OF THIRD METACARPAL BONE OF RIGHT HAND, INITIAL ENCOUNTER: ICD-10-CM

## 2019-10-07 PROCEDURE — 99024 POSTOP FOLLOW-UP VISIT: CPT | Performed by: SURGERY

## 2019-10-07 PROCEDURE — L3913 HFO W/O JOINTS CF: HCPCS | Performed by: OCCUPATIONAL THERAPIST

## 2019-10-07 PROCEDURE — 73130 X-RAY EXAM OF HAND: CPT

## 2019-10-07 NOTE — PROGRESS NOTES
Assessment/Plan:  Patient ID: Negrito Joy 30 y o  male   Surgery: Hand Open Reduction W/ Internal Fixation (orif) - Right  Date of Surgery: 9/24/2019    13 days s/p right 3rd metacarpal ORIF  Sutures were removed today  He was advised to start scar massage  OT was ordered for a hand based clam shell splint, MP's free  He will also start OT for ROM, stretching and strengthening exercises  I will see him back in 4 weeks time with repeat right hand x-ray's  Follow Up:  4  week(s)    To Do Next Visit:  X-rays of the  right  hand and Cast/splint off prior to x-ray      CHIEF COMPLAINT:  Chief Complaint   Patient presents with    Right Hand - Post-op         SUBJECTIVE:  Negrito Joy is a 30y o  year old male who presents for follow up after Hand Open Reduction W/ Internal Fixation (orif) - Right  Today patient has mild pain  He is not taking anything for pain control at this time          PHYSICAL EXAMINATION:  General: well developed and well nourished, alert, oriented times 3 and appears comfortable  Psychiatric: Normal    MUSCULOSKELETAL EXAMINATION:  Incision: Clean, dry, intact and suture(s) intact   Surgery Site: normal, no evidence of infection  and swelling present  Range of Motion: As expected  Neurovascular status: Neuro intact, good cap refill  Activity Restrictions: Cast/splint restrictions  Done today: Sutures out      STUDIES REVIEWED:  Images were reviewd in PACS:   Nonnie Trenton of 3rd metacarpal fracture with good hardware position      PROCEDURES PERFORMED:  Procedures  No Procedures performed today       Scribe Attestation    I,:   Adryan Guan am acting as a scribe while in the presence of the attending physician :        I,:   Maggie Mart MD personally performed the services described in this documentation    as scribed in my presence :

## 2019-10-07 NOTE — PROGRESS NOTES
Orthosis    Diagnosis:   1  Closed displaced fracture of shaft of third metacarpal bone of right hand, initial encounter  Ambulatory referral to PT/OT hand therapy   2  S/P ORIF (open reduction internal fixation) fracture  Ambulatory referral to PT/OT hand therapy     Indication: Fracture    Location: Right  index finger, long finger, ring finger and small finger  Supplies: Custom Fit Orthotic  Orthosis type: Hand based clamshell W/O MP  Wearing Schedule: Remove for hygiene only and Remove for HEP  Describe Position: MC base clamshell MPs free    Precautions: Fracture    Patient or Caregiver expresses understanding of wearing Schedule and Precautions? Yes  Patient or Caregiver able to don/doff orthotic independently? Yes    Written orders provided to patient?  Yes  Orders Obtained: Written  Orders Obtained from: [facility]    Return for evaluation and treatment No

## 2019-10-14 ENCOUNTER — APPOINTMENT (OUTPATIENT)
Dept: OCCUPATIONAL THERAPY | Facility: CLINIC | Age: 34
End: 2019-10-14
Payer: OTHER MISCELLANEOUS

## 2019-10-23 ENCOUNTER — TELEPHONE (OUTPATIENT)
Dept: OBGYN CLINIC | Facility: HOSPITAL | Age: 34
End: 2019-10-23

## 2019-10-23 NOTE — TELEPHONE ENCOUNTER
Pt is calling asking if the paperwork he dropped off was signed  Pt states he needs to return it today         Pt can be reached at [phone]

## 2019-10-23 NOTE — TELEPHONE ENCOUNTER
Hi,    I called patient and told him I completed the form on 10/16 and faxed it to [facility]  Patient says they want him to bring it to them  I emailed him a copy and he would also like to stop in at [facility] and  a copy  (Scanned into Media)    Thanks!

## 2019-10-28 ENCOUNTER — TELEPHONE (OUTPATIENT)
Dept: OBGYN CLINIC | Facility: HOSPITAL | Age: 34
End: 2019-10-28

## 2019-10-28 NOTE — TELEPHONE ENCOUNTER
[facility] called, they need a new physical therapy, stated that it needs to say just for occupation therapy   If a new script can be written and faxed to [phone]

## 2019-10-29 DIAGNOSIS — Z98.890 S/P ORIF (OPEN REDUCTION INTERNAL FIXATION) FRACTURE: Primary | ICD-10-CM

## 2019-10-29 DIAGNOSIS — S62.322A CLOSED DISPLACED FRACTURE OF SHAFT OF THIRD METACARPAL BONE OF RIGHT HAND, INITIAL ENCOUNTER: ICD-10-CM

## 2019-10-29 DIAGNOSIS — Z87.81 S/P ORIF (OPEN REDUCTION INTERNAL FIXATION) FRACTURE: Primary | ICD-10-CM

## 2019-12-26 ENCOUNTER — OFFICE VISIT (OUTPATIENT)
Dept: OBGYN CLINIC | Facility: CLINIC | Age: 34
End: 2019-12-26
Payer: OTHER MISCELLANEOUS

## 2019-12-26 ENCOUNTER — APPOINTMENT (OUTPATIENT)
Dept: RADIOLOGY | Facility: AMBULARY SURGERY CENTER | Age: 34
End: 2019-12-26
Attending: SURGERY

## 2019-12-26 VITALS
DIASTOLIC BLOOD PRESSURE: 81 MMHG | WEIGHT: 275 LBS | HEIGHT: 71 IN | HEART RATE: 64 BPM | BODY MASS INDEX: 38.5 KG/M2 | SYSTOLIC BLOOD PRESSURE: 131 MMHG

## 2019-12-26 DIAGNOSIS — S62.322A CLOSED DISPLACED FRACTURE OF SHAFT OF THIRD METACARPAL BONE OF RIGHT HAND, INITIAL ENCOUNTER: Primary | ICD-10-CM

## 2019-12-26 DIAGNOSIS — S62.322A CLOSED DISPLACED FRACTURE OF SHAFT OF THIRD METACARPAL BONE OF RIGHT HAND, INITIAL ENCOUNTER: ICD-10-CM

## 2019-12-26 PROCEDURE — 73130 X-RAY EXAM OF HAND: CPT

## 2019-12-26 PROCEDURE — 99213 OFFICE O/P EST LOW 20 MIN: CPT | Performed by: SURGERY

## 2019-12-26 NOTE — PROGRESS NOTES
Assessment/Plan:  Patient ID: Addie Martin 30 y o  male   Surgery: Hand Open Reduction W/ Internal Fixation (orif) - Right  Date of Surgery: 2019    {kmtreatments:31027}    Follow Up:  {follow up time choices:96726}    To Do Next Visit:  {To do next visit:04561::" "}      CHIEF COMPLAINT:  Chief Complaint   Patient presents with    Right Hand - Post-op         SUBJECTIVE:  Addie Martin is a 30y o  year old male who presents for follow up after Hand Open Reduction W/ Internal Fixation (orif) - Right  Today patient has {Complaint:91138}         PHYSICAL EXAMINATION:  General: {1234:00561::"well developed and well nourished","alert","oriented times 3","appears comfortable"}  Psychiatric: {Psych:53002::"Normal"}    MUSCULOSKELETAL EXAMINATION:  Incision: {post op incision:94762::"Clean, dry, intact"}  Surgery Site: {post op incision:04967}  Range of Motion: {post op rom:06785::"As expected"}  Neurovascular status: {post op neuro exam:77818::"Neuro intact, good cap refill"}  Activity Restrictions: {post op activity:15068::"No restrictions"}  {anything done today?:25294}      STUDIES REVIEWED:  {kmimagin::"No Studies to review"}      PROCEDURES PERFORMED:  Procedures  {Was Procdoc done:58135::"No Procedures performed today"}    Scribe Attestation    I,:    am acting as a scribe while in the presence of the attending physician :        I,:    personally performed the services described in this documentation    as scribed in my presence :

## 2019-12-26 NOTE — PROGRESS NOTES
ASSESSMENT/PLAN:      74-year-old male status post right 3rd metacarpal ORIF  Date of surgery 09/24/2019  Patient may continue with scar massage and continue to work on his range of motion at home  From an orthopedic standpoint he is cleared to return to work  He may follow up with me as needed  The patient verbalized understanding of exam findings and treatment plan  We engaged in the shared decision-making process and treatment options were discussed at length with the patient  Surgical and conservative management discussed today along with risks and benefits  Diagnoses and all orders for this visit:    Closed displaced fracture of shaft of third metacarpal bone of right hand, initial encounter  -     XR hand 3+ vw right; Future            Follow Up:  Return if symptoms worsen or fail to improve  To Do Next Visit:  Re-evaluation of current issue    ____________________________________________________________________________________________________________________________________________      CHIEF COMPLAINT:  Chief Complaint   Patient presents with    Right Hand - Post-op       SUBJECTIVE:  Fermín Sanders is a 30y o  year old RHD male who presents the office today for follow-up evaluation status post right 3rd metacarpal ORIF  Patient states since his last visit he has not been using immobilization  His clamshell splint broke in early October  He notes he has not been attending formal therapy but has done home therapy on his own  He denies any significant pain  He does note discomfort with wrist extension  At this point time he would like to be cleared to return to work  Denies any new injury  He denies any distal paresthesias  I have personally reviewed all the relevant PMH, PSH, SH, FH, Medications and allergies  PAST MEDICAL HISTORY:  History reviewed  No pertinent past medical history      PAST SURGICAL HISTORY:  Past Surgical History:   Procedure Laterality Date    PILONIDAL CYST EXCISION      NV OPEN TX METACARPAL FRACTURE SINGLE EA BONE Right 9/24/2019    Procedure: HAND OPEN REDUCTION W/ INTERNAL FIXATION (ORIF); Surgeon: Josias Kemp MD;  Location: [facility];  Service: Orthopedics       FAMILY HISTORY:  History reviewed  No pertinent family history  SOCIAL HISTORY:  Social History     Tobacco Use    Smoking status: Current Every Day Smoker     Packs/day: 1 00     Types: Cigarettes    Smokeless tobacco: Never Used   Substance Use Topics    Alcohol use: Yes     Frequency: Monthly or less     Drinks per session: 3 or 4     Comment: occasional    Drug use: Yes     Types: Marijuana     Comment: daily       MEDICATIONS:    Current Outpatient Medications:     naproxen (NAPROSYN) 500 mg tablet, Take 1 tablet (500 mg total) by mouth 2 (two) times a day with meals (Patient not taking: Reported on 12/26/2019), Disp: 30 tablet, Rfl: 0    ALLERGIES:  No Known Allergies    REVIEW OF SYSTEMS:  Review of Systems   Constitutional: Negative for chills, fever and unexpected weight change  HENT: Negative for hearing loss, nosebleeds and sore throat  Eyes: Negative for pain, redness and visual disturbance  Respiratory: Negative for cough, shortness of breath and wheezing  Cardiovascular: Negative for chest pain, palpitations and leg swelling  Gastrointestinal: Negative for abdominal pain, nausea and vomiting  Endocrine: Negative for polydipsia and polyuria  Genitourinary: Negative for dysuria and hematuria  Musculoskeletal: Negative for arthralgias, joint swelling and myalgias  Skin: Negative for rash and wound  Neurological: Negative for dizziness, numbness and headaches  Psychiatric/Behavioral: Negative for agitation, decreased concentration and suicidal ideas         VITALS:  Vitals:    12/26/19 0953   BP: 131/81   Pulse: 64       LABS:  HgA1c: No results found for: HGBA1C  BMP:   Lab Results   Component Value Date    CALCIUM 8 6 07/19/2018    K 4 7 07/19/2018    CO2 27 07/19/2018     (H) 07/19/2018    BUN 11 07/19/2018    CREATININE 1 20 07/19/2018       _____________________________________________________  PHYSICAL EXAMINATION:  General: well developed and well nourished, alert, oriented times 3 and appears comfortable  Psychiatric: Normal  HEENT: Normocephalic, Atraumatic Trachea Midline, No torticollis  Pulmonary: No audible wheezing or respiratory distress   Cardiovascular: No pitting edema, 2+ radial pulse   Skin: No masses, erythema, lacerations, fluctation, ulcerations  Neurovascular: Sensation Intact to the Median, Ulnar, Radial Nerve, Motor Intact to the Median, Ulnar, Radial Nerve and Pulses Intact  Musculoskeletal: Normal, except as noted in detailed exam and in HPI        MUSCULOSKELETAL EXAMINATION:  Right hand  Incision well healed  Nontender to palpate 3rd metacarpal  Slight extensor lag noted of long finger, improved from last visit  Able to make full composite fist  Stiffness noted in wrist extension with mild discomfort  Sensation intact    ___________________________________________________  STUDIES REVIEWED:  I have personally reviewed AP lateral and oblique radiographs of right hand obtained on 12/26/2019  which demonstrate well-aligned orthopedic hardware with abundant callus formation at fracture site          PROCEDURES PERFORMED:  Procedures  No Procedures performed today    _____________________________________________________      Scribe Attestation    I,:   Bri Mercado MA am acting as a scribe while in the presence of the attending physician :        I,:   Annie Odonnell MD personally performed the services described in this documentation    as scribed in my presence :

## 2019-12-26 NOTE — LETTER
December 26, 2019     Patient: Danilo Estevez   YOB: 1985   Date of Visit: 12/26/2019       To Whom it May Concern:    Danilo Estevez is under my professional care  He was seen in my office on 12/26/2019  He may return to work on 12/26/2019 with no restrictions  If you have any questions or concerns, please don't hesitate to call           Sincerely,          Delfin Frankel, MD        CC: No Recipients

## 2020-01-10 ENCOUNTER — TELEPHONE (OUTPATIENT)
Dept: OBGYN CLINIC | Facility: HOSPITAL | Age: 35
End: 2020-01-10

## 2020-01-10 NOTE — TELEPHONE ENCOUNTER
Dorian Osborn work comp  cb [phone]  Fax [phone]    Gian Cotto is asking for the patient's last ovn & work note  I faxed to the number above

## 2022-08-02 ENCOUNTER — HOSPITAL ENCOUNTER (EMERGENCY)
Facility: HOSPITAL | Age: 37
Discharge: HOME | End: 2022-08-02
Attending: EMERGENCY MEDICINE

## 2022-08-02 VITALS
OXYGEN SATURATION: 96 % | RESPIRATION RATE: 16 BRPM | TEMPERATURE: 98.4 F | DIASTOLIC BLOOD PRESSURE: 87 MMHG | HEART RATE: 64 BPM | SYSTOLIC BLOOD PRESSURE: 168 MMHG

## 2022-08-02 DIAGNOSIS — K04.7 DENTAL INFECTION: Primary | ICD-10-CM

## 2022-08-02 DIAGNOSIS — K08.89 PAIN, DENTAL: ICD-10-CM

## 2022-08-02 PROCEDURE — 64400 NJX AA&/STRD TRIGEMINAL NRV: CPT | Performed by: EMERGENCY MEDICINE

## 2022-08-02 PROCEDURE — 99284 EMERGENCY DEPT VISIT MOD MDM: CPT | Performed by: EMERGENCY MEDICINE

## 2022-08-02 PROCEDURE — 99282 EMERGENCY DEPT VISIT SF MDM: CPT

## 2022-08-02 PROCEDURE — 96372 THER/PROPH/DIAG INJ SC/IM: CPT

## 2022-08-02 RX ORDER — KETOROLAC TROMETHAMINE 30 MG/ML
15 INJECTION, SOLUTION INTRAMUSCULAR; INTRAVENOUS ONCE
Status: COMPLETED | OUTPATIENT
Start: 2022-08-02 | End: 2022-08-02

## 2022-08-02 RX ORDER — ROPIVACAINE HYDROCHLORIDE 5 MG/ML
15 INJECTION, SOLUTION EPIDURAL; INFILTRATION; PERINEURAL ONCE
Status: COMPLETED | OUTPATIENT
Start: 2022-08-02 | End: 2022-08-02

## 2022-08-02 RX ORDER — PENICILLIN V POTASSIUM 500 MG/1
500 TABLET ORAL ONCE
Status: COMPLETED | OUTPATIENT
Start: 2022-08-02 | End: 2022-08-02

## 2022-08-02 RX ORDER — PENICILLIN V POTASSIUM 500 MG/1
500 TABLET ORAL 4 TIMES DAILY
Qty: 40 TABLET | Refills: 0 | Status: SHIPPED | OUTPATIENT
Start: 2022-08-02 | End: 2022-08-09

## 2022-08-02 RX ADMIN — ROPIVACAINE HYDROCHLORIDE 15 ML: 5 INJECTION EPIDURAL; INFILTRATION; PERINEURAL at 20:29

## 2022-08-02 RX ADMIN — PENICILLIN V POTASSIUM 500 MG: 500 TABLET, FILM COATED ORAL at 20:30

## 2022-08-02 RX ADMIN — KETOROLAC TROMETHAMINE 15 MG: 30 INJECTION, SOLUTION INTRAMUSCULAR; INTRAVENOUS at 20:30

## 2022-08-03 ENCOUNTER — HOSPITAL ENCOUNTER (EMERGENCY)
Facility: HOSPITAL | Age: 37
Discharge: HOME | End: 2022-08-03
Attending: EMERGENCY MEDICINE | Admitting: EMERGENCY MEDICINE

## 2022-08-03 VITALS
HEART RATE: 80 BPM | OXYGEN SATURATION: 97 % | WEIGHT: 315 LBS | SYSTOLIC BLOOD PRESSURE: 154 MMHG | DIASTOLIC BLOOD PRESSURE: 96 MMHG | TEMPERATURE: 96.7 F | BODY MASS INDEX: 43.93 KG/M2 | RESPIRATION RATE: 18 BRPM

## 2022-08-03 DIAGNOSIS — K04.7 DENTAL INFECTION: Primary | ICD-10-CM

## 2022-08-03 PROCEDURE — 99284 EMERGENCY DEPT VISIT MOD MDM: CPT | Performed by: EMERGENCY MEDICINE

## 2022-08-03 PROCEDURE — 96372 THER/PROPH/DIAG INJ SC/IM: CPT

## 2022-08-03 PROCEDURE — 99282 EMERGENCY DEPT VISIT SF MDM: CPT

## 2022-08-03 PROCEDURE — 41800 DRAINAGE OF GUM LESION: CPT | Performed by: EMERGENCY MEDICINE

## 2022-08-03 RX ORDER — KETOROLAC TROMETHAMINE 30 MG/ML
15 INJECTION, SOLUTION INTRAMUSCULAR; INTRAVENOUS ONCE
Status: COMPLETED | OUTPATIENT
Start: 2022-08-03 | End: 2022-08-03

## 2022-08-03 RX ORDER — PENICILLIN V POTASSIUM 500 MG/1
500 TABLET ORAL ONCE
Status: COMPLETED | OUTPATIENT
Start: 2022-08-03 | End: 2022-08-03

## 2022-08-03 RX ORDER — PENICILLIN V POTASSIUM 500 MG/1
500 TABLET ORAL 4 TIMES DAILY
Qty: 28 TABLET | Refills: 0 | Status: SHIPPED | OUTPATIENT
Start: 2022-08-03 | End: 2022-08-10

## 2022-08-03 RX ORDER — ROPIVACAINE HYDROCHLORIDE 5 MG/ML
15 INJECTION, SOLUTION EPIDURAL; INFILTRATION; PERINEURAL ONCE
Status: COMPLETED | OUTPATIENT
Start: 2022-08-03 | End: 2022-08-03

## 2022-08-03 RX ORDER — CHLORHEXIDINE GLUCONATE 0.12 MG/ML
15 RINSE ORAL 2 TIMES DAILY
Qty: 120 ML | Refills: 0 | Status: SHIPPED | OUTPATIENT
Start: 2022-08-03

## 2022-08-03 RX ORDER — LIDOCAINE HYDROCHLORIDE AND EPINEPHRINE 10; 10 MG/ML; UG/ML
5 INJECTION, SOLUTION INFILTRATION; PERINEURAL ONCE
Status: COMPLETED | OUTPATIENT
Start: 2022-08-03 | End: 2022-08-03

## 2022-08-03 RX ORDER — ACETAMINOPHEN 325 MG/1
975 TABLET ORAL ONCE
Status: COMPLETED | OUTPATIENT
Start: 2022-08-03 | End: 2022-08-03

## 2022-08-03 RX ADMIN — PENICILLIN V POTASSIUM 500 MG: 500 TABLET, FILM COATED ORAL at 09:11

## 2022-08-03 RX ADMIN — LIDOCAINE HYDROCHLORIDE,EPINEPHRINE BITARTRATE 5 ML: 10; .01 INJECTION, SOLUTION INFILTRATION; PERINEURAL at 09:16

## 2022-08-03 RX ADMIN — ACETAMINOPHEN 975 MG: 325 TABLET ORAL at 09:11

## 2022-08-03 RX ADMIN — ROPIVACAINE HYDROCHLORIDE 15 ML: 5 INJECTION EPIDURAL; INFILTRATION; PERINEURAL at 09:22

## 2022-08-03 RX ADMIN — TOPICAL ANESTHETIC 2 SPRAY: 200 SPRAY DENTAL; PERIODONTAL at 09:16

## 2022-08-03 RX ADMIN — KETOROLAC TROMETHAMINE 15 MG: 30 INJECTION, SOLUTION INTRAMUSCULAR; INTRAVENOUS at 09:12

## 2022-08-03 NOTE — ED PROVIDER NOTES
History  Chief Complaint   Patient presents with    Dental Pain     Started yesterday right lower tooth      Patient is a 57-year-old male presenting to the emergency department for evaluation of tooth pain  Patient states that starting yesterday his right lower tooth started to really hurt  He denies any fevers or chills chest pain or shortness of breath any nausea, vomiting, diarrhea, constipation, headache, dizziness, tinnitus, vision change, difficulty swallowing  He denies any ear pain, throat pain, nasal congestion  Patient states he has had this happen to him in the past and has required teeth to be pulled  Patient has not seen a dentist for this want to come to the hospital for further evaluation  Patient has tried over-the-counter medications without any alleviation of his symptoms  Prior to Admission Medications   Prescriptions Last Dose Informant Patient Reported? Taking?   naproxen (NAPROSYN) 500 mg tablet   No No   Sig: Take 1 tablet (500 mg total) by mouth 2 (two) times a day with meals   Patient not taking: Reported on 12/26/2019      Facility-Administered Medications: None       History reviewed  No pertinent past medical history  Past Surgical History:   Procedure Laterality Date    PILONIDAL CYST EXCISION      [place] OPEN TX METACARPAL FRACTURE SINGLE EA BONE Right 9/24/2019    Procedure: HAND OPEN REDUCTION W/ INTERNAL FIXATION (ORIF); Surgeon: Avani Abdullahi MD;  Location: [facility];  Service: Orthopedics       History reviewed  No pertinent family history  I have reviewed and agree with the history as documented      E-Cigarette/Vaping     E-Cigarette/Vaping Substances     Social History     Tobacco Use    Smoking status: Current Every Day Smoker     Packs/day: 1 00     Types: Cigarettes    Smokeless tobacco: Never Used   Substance Use Topics    Alcohol use: Yes     Comment: occasional    Drug use: Yes     Types: Marijuana     Comment: daily        Review of Systems   Constitutional: Positive for activity change  Negative for chills, fatigue and fever  HENT: Positive for dental problem  Negative for congestion, drooling, ear discharge, ear pain, mouth sores, nosebleeds, postnasal drip, sinus pressure, sinus pain, sore throat, trouble swallowing and voice change  Eyes: Negative for pain and visual disturbance  Respiratory: Negative for cough, chest tightness and shortness of breath  Cardiovascular: Negative for chest pain and palpitations  Gastrointestinal: Negative for abdominal pain, constipation, diarrhea, nausea and vomiting  Genitourinary: Negative for dysuria and hematuria  Musculoskeletal: Negative for arthralgias and back pain  Skin: Negative for color change and rash  Neurological: Negative for dizziness, seizures, syncope, weakness, light-headedness, numbness and headaches  Psychiatric/Behavioral: Negative for agitation and behavioral problems  All other systems reviewed and are negative  Physical Exam  ED Triage Vitals   Temperature Pulse Respirations Blood Pressure SpO2   08/02/22 1933 08/02/22 1933 08/02/22 1933 08/02/22 1933 08/02/22 1933   98 4 °F (36 9 °C) 64 16 168/87 96 %      Temp Source Heart Rate Source Patient Position - Orthostatic VS BP Location FiO2 (%)   08/02/22 1933 08/02/22 1933 -- 08/02/22 1933 --   Temporal Monitor  Left arm       Pain Score       08/02/22 2029       10 - Worst Possible Pain             Orthostatic Vital Signs  Vitals:    08/02/22 1933   BP: 168/87   Pulse: 64       Physical Exam  Vitals and nursing note reviewed  Constitutional:       General: He is not in acute distress  Appearance: Normal appearance  He is well-developed and normal weight  He is not ill-appearing  HENT:      Head: Normocephalic and atraumatic        Right Ear: Tympanic membrane, ear canal and external ear normal       Left Ear: Tympanic membrane, ear canal and external ear normal       Nose: Nose normal  Mouth/Throat:      Mouth: Mucous membranes are moist       Dentition: Abnormal dentition  Dental caries present  No dental tenderness  Pharynx: Uvula midline  Tonsils: No tonsillar exudate or tonsillar abscesses  Comments: Decaying tooth as circled above  No obvious abscess formation   Eyes:      Extraocular Movements: Extraocular movements intact  Conjunctiva/sclera: Conjunctivae normal       Pupils: Pupils are equal, round, and reactive to light  Cardiovascular:      Rate and Rhythm: Normal rate and regular rhythm  Heart sounds: Normal heart sounds  No murmur heard  Pulmonary:      Effort: Pulmonary effort is normal  No respiratory distress  Breath sounds: Normal breath sounds  No wheezing or rhonchi  Abdominal:      General: Abdomen is flat  Bowel sounds are normal       Palpations: Abdomen is soft  Tenderness: There is no abdominal tenderness  There is no right CVA tenderness, left CVA tenderness, guarding or rebound  Musculoskeletal:      Cervical back: Neck supple  Skin:     General: Skin is warm and dry  Neurological:      Mental Status: He is alert  ED Medications  Medications   ropivacaine (NAROPIN) 0 5 % injection 15 mL (15 mL Infiltration Given by Other 8/2/22 2029)   penicillin V potassium (VEETID) tablet 500 mg (500 mg Oral Given 8/2/22 2030)   ketorolac (TORADOL) injection 15 mg (15 mg Intramuscular Given 8/2/22 2030)       Diagnostic Studies  Results Reviewed     None                 No orders to display         Procedures  Nerve block    Date/Time: 8/2/2022 10:04 PM  Performed by: Bethany Ibarra DO  Authorized by: Bethany Ibarra DO     Patient location:  ED  [place] Protocol:  Consent: Verbal consent obtained    Risks and benefits: risks, benefits and alternatives were discussed  Consent given by: patient  Time out: Immediately prior to procedure a "time out" was called to verify the correct patient, procedure, equipment, support staff and site/side marked as required  Patient understanding: patient states understanding of the procedure being performed  Patient consent: the patient's understanding of the procedure matches consent given  Required items: required blood products, implants, devices, and special equipment available  Patient identity confirmed: verbally with patient      Indications:     Indications:  Pain relief  Location:     Nerve block body site: dental     Laterality:  Right  Procedure details (see MAR for exact dosages): Block needle gauge:  22 G    Block anesthetic: ropivicane   5  Post-procedure details:     Outcome:  Anesthesia achieved    Patient tolerance of procedure: Tolerated well, no immediate complications          ED Course  ED Course as of 08/03/22 0008   Tue Aug 02, 2022   2048 Blood Pressure: 168/87   2048 Temperature: 98 4 °F (36 9 °C)   2048 Temp Source: Temporal   2048 Pulse: 64   2048 Respirations: 16   2048 SpO2: 96 %                             SBIRT 22yo+    Flowsheet Row Most Recent Value   SBIRT (25 yo +)    In order to provide better care to our patients, we are screening all of our patients for alcohol and drug use  Would it be okay to ask you these screening questions? No Filed at: 08/02/2022 2033                MDM  Number of Diagnoses or Management Options  Dental infection  Pain, dental  Diagnosis management comments: Patient is a 62-year-old male presenting to the emergency department chief complaint of right dental pain  Will treat patient with a shot of Toradol as well as a dental block to alleviate his symptoms  Will also prophylactically place patient on penicillin to treat any alveolar infections  Advised him to call the dental clinic 1st thing tomorrow morning around 8:00 a m  To trying get in for an appointment  Ultimately I believe the tooth will need to be pulled as it is decaying  Patient is feeling much better after the dental block    He is advised to take the prescription as prescribed on the bottle  Patient has no questions or concerns he is stable for discharge  Disposition  Final diagnoses:   Pain, dental   Dental infection     Time reflects when diagnosis was documented in both MDM as applicable and the Disposition within this note     Time User Action Codes Description Comment    8/2/2022  8:26 PM Eather Hidden Add [K08 89] Pain, dental     8/2/2022  8:26 PM Eather Hidden Add [K04 7] Dental infection     8/2/2022  8:26 PM Eather Hidden Modify [K08 89] Pain, dental     8/2/2022  8:26 PM Eather Hidden Modify [K04 7] Dental infection       ED Disposition     ED Disposition   Discharge    Condition   Stable    Date/Time   Tue Aug 2, 2022  8:47 PM    Comment   Bety Pipes discharge to home/self care  Follow-up Information     Follow up With Specialties Details Why Contact Info Additional [address] Emergency Department Emergency Medicine Go to  As needed, If symptoms worsen [address] [phone]  [address] Emergency Department, [address], [address]  Schedule an appointment as soon as possible for a visit  for follow up [address]  [address]  [phone]           Discharge Medication List as of 8/2/2022  8:48 PM      START taking these medications    Details   penicillin V potassium (VEETID) 500 mg tablet Take 1 tablet (500 mg total) by mouth 4 (four) times a day for 7 days, Starting Tue 8/2/2022, Until Tue 8/9/2022, Normal         CONTINUE these medications which have NOT CHANGED    Details   naproxen (NAPROSYN) 500 mg tablet Take 1 tablet (500 mg total) by mouth 2 (two) times a day with meals, Starting Sun 9/8/2019, Print           No discharge procedures on file      PDMP Review     None           ED Provider  Attending physically available and evaluated Tara Rosales I managed the patient along with the ED Attending      Electronically Signed by         Rose Ulloa DO  08/03/22 0006

## 2022-08-03 NOTE — ED ATTENDING ATTESTATION
8/3/2022  IMelissa MD, saw and evaluated the patient  I have discussed the patient with the resident/non-physician practitioner and agree with the resident's/non-physician practitioner's findings, Plan of Care, and MDM as documented in the resident's/non-physician practitioner's note, except where noted  All available labs and Radiology studies were reviewed  I was present for key portions of any procedure(s) performed by the resident/non-physician practitioner and I was immediately available to provide assistance  At this point I agree with the current assessment done in the Emergency Department    I have conducted an independent evaluation of this patient a history and physical is as follows:  Patient is here with dental pain he has some facial swelling is seen yesterday in the ER is prescribed penicillin but he never took the medications he took aspirin yesterday for pain  Exam the patient is a mild facial swelling along the right mandible  Patient has carious teeth    There is a periapical abscess type collection adjacent to tooth 28 and 29  ED Course         Critical Care Time  Procedures

## 2022-08-03 NOTE — ED PROVIDER NOTES
History  Chief Complaint   Patient presents with    Dental Pain     Patient states that he was here last night with right sided lower dental pain  States they gave him pain medication but last night he started with chills, sweats and body aches  Today has swelling to lower right side of face  16-year-old male with no significant past medical history presents with dental pain  Patient was here last night with right-sided lower dental pain  Patient was told that he has a dental infection in a tooth that has been chipped away  He was prescribed penicillin and told to take anti-inflammatory medications  Patient did not  his prescriptions for take any medications for his pain  He woke up today with worsening pain  He additionally has swelling on the right side of his face  He reports subjective chills and sweating home  Patient did not try to schedule follow-up with a dentist   He is having no trouble breathing or swallowing  No trismus  Prior to Admission Medications   Prescriptions Last Dose Informant Patient Reported? Taking?   naproxen (NAPROSYN) 500 mg tablet   No No   Sig: Take 1 tablet (500 mg total) by mouth 2 (two) times a day with meals   Patient not taking: Reported on 12/26/2019   penicillin V potassium (VEETID) 500 mg tablet   No No   Sig: Take 1 tablet (500 mg total) by mouth 4 (four) times a day for 7 days      Facility-Administered Medications: None       No past medical history on file  Past Surgical History:   Procedure Laterality Date    PILONIDAL CYST EXCISION      [place] OPEN TX METACARPAL FRACTURE SINGLE EA BONE Right 9/24/2019    Procedure: HAND OPEN REDUCTION W/ INTERNAL FIXATION (ORIF); Surgeon: Edmar Reis MD;  Location[facility];  Service: Orthopedics       No family history on file  I have reviewed and agree with the history as documented      E-Cigarette/Vaping    E-Cigarette Use Current Every Day User      E-Cigarette/Vaping Substances Social History     Tobacco Use    Smoking status: Current Every Day Smoker     Packs/day: 1 00     Types: Cigarettes    Smokeless tobacco: Never Used   Vaping Use    Vaping Use: Every day   Substance Use Topics    Alcohol use: Yes     Comment: occasional    Drug use: Yes     Types: Marijuana     Comment: daily        Review of Systems   Constitutional: Negative for appetite change, chills, fatigue and fever  HENT: Positive for dental problem and facial swelling  Negative for drooling, sinus pressure, sinus pain, sore throat, trouble swallowing and voice change  Eyes: Negative  Respiratory: Negative for cough, chest tightness and shortness of breath  Cardiovascular: Negative for chest pain and palpitations  Gastrointestinal: Negative for abdominal pain, diarrhea, nausea and vomiting  Endocrine: Negative  Genitourinary: Negative for difficulty urinating and hematuria  Musculoskeletal: Negative for arthralgias and myalgias  Skin: Negative for pallor and rash  Allergic/Immunologic: Negative  Neurological: Negative for dizziness, weakness, light-headedness and headaches  Hematological: Negative  Physical Exam  ED Triage Vitals   Temperature Pulse Respirations Blood Pressure SpO2   08/03/22 0857 08/03/22 0838 08/03/22 0838 08/03/22 0838 08/03/22 0838   (!) 96 7 °F (35 9 °C) 80 18 154/96 97 %      Temp Source Heart Rate Source Patient Position - Orthostatic VS BP Location FiO2 (%)   08/03/22 0857 08/03/22 0838 08/03/22 0838 08/03/22 0838 --   Tympanic Monitor Lying Left arm       Pain Score       08/03/22 0838       8             Orthostatic Vital Signs  Vitals:    08/03/22 0838   BP: 154/96   Pulse: 80   Patient Position - Orthostatic VS: Lying       Physical Exam  Vitals and nursing note reviewed  Constitutional:       General: He is not in acute distress  Appearance: Normal appearance  He is not ill-appearing  HENT:      Head: Normocephalic and atraumatic  Mouth/Throat:      Pharynx: No posterior oropharyngeal erythema  Comments: Tooth number 30 is almost completely chipped away  Patient has pain to palpation of this tooth  Some erythema surrounding the tooth  No obvious abscess  There is swelling of the right cheek  Floor of the mouth is soft  No neck swelling  Eyes:      Conjunctiva/sclera: Conjunctivae normal    Cardiovascular:      Rate and Rhythm: Normal rate and regular rhythm  Pulmonary:      Effort: Pulmonary effort is normal    Musculoskeletal:         General: Normal range of motion  Cervical back: Normal range of motion and neck supple  Skin:     General: Skin is warm and dry  Neurological:      General: No focal deficit present  Mental Status: He is alert and oriented to person, place, and time  ED Medications  Medications   ketorolac (TORADOL) injection 15 mg (15 mg Intramuscular Given 8/3/22 0912)   acetaminophen (TYLENOL) tablet 975 mg (975 mg Oral Given 8/3/22 0911)   penicillin V potassium (VEETID) tablet 500 mg (500 mg Oral Given 8/3/22 0911)   benzocaine (HURRICAINE) 20 % mucosal spray 2 spray (2 sprays Mucosal Given by Other 8/3/22 3016)   lidocaine-epinephrine (XYLOCAINE/EPINEPHRINE) 1 %-1:100,000 injection 5 mL (5 mL Infiltration Given by Other 8/3/22 2116)   ropivacaine (NAROPIN) 0 5 % injection 15 mL (15 mL Infiltration Given by Other 8/3/22 1337)       Diagnostic Studies  Results Reviewed     None                 No orders to display         Procedures  Incision and drain    Date/Time: 8/3/2022 10:04 AM  Performed by: Osiris Lincoln DO  Authorized by: Osiris Lincoln DO   Universal Protocol:  Consent: Verbal consent obtained  Risks and benefits: risks, benefits and alternatives were discussed  Consent given by: patient  Time out: Immediately prior to procedure a "time out" was called to verify the correct patient, procedure, equipment, support staff and site/side marked as required    Timeout called at: 8/3/2022 10:04 AM   Patient understanding: patient states understanding of the procedure being performed  Patient consent: the patient's understanding of the procedure matches consent given  Procedure consent: procedure consent matches procedure scheduled  Relevant documents: relevant documents present and verified  Test results: test results available and properly labeled  Site marked: the operative site was marked  Required items: required blood products, implants, devices, and special equipment available  Patient identity confirmed: verbally with patient      Patient location:  ED  Location:     Indications for incision and drainage: Possible dental abscess on tooth number 30  Location:  Head/neck  Anesthesia (see MAR for exact dosages): Anesthesia method:  Local infiltration    Local anesthetic:  Lidocaine 1% w/o epi (Ropivacaine)  Procedure details:     Complexity:  Simple    Needle aspiration: yes      Needle size:  18 G    Drainage characteristics: No drainage  Wound treatment:  Wound left open  Post-procedure details:     Patient tolerance of procedure: Tolerated well, no immediate complications          ED Course                             SBIRT 20yo+    Flowsheet Row Most Recent Value   SBIRT (25 yo +)    In order to provide better care to our patients, we are screening all of our patients for alcohol and drug use  Would it be okay to ask you these screening questions? No Filed at: 08/03/2022 9251                [facility]  Number of Diagnoses or Management Options  Dental infection: established and improving  Diagnosis management comments: 81-year-old male presents with a dental infection  Patient was here yesterday  Patient did not start antibiotics that he was prescribed or taking any kind of pain medication  Will try needle aspiration for possible abscess since patient has increased swelling since yesterday    I reiterated that patient needs to follow-up with a dentist        Amount and/or Complexity of Data Reviewed  Review and summarize past medical records: yes  Discuss the patient with other providers: yes    Risk of Complications, Morbidity, and/or Mortality  Presenting problems: low  Diagnostic procedures: low  Management options: low    Patient Progress  Patient progress: improved    Nothing was aspirated on the on aspiration  Patient had a local block to the area with lidocaine and ropivacaine  Pain improved  He was scheduled an appointment with the dental clinic tomorrow  Return precautions given  All questions answered  Patient was told that he needs to  and start his penicillin  Continue NSAIDs for pain  Disposition  Final diagnoses:   Dental infection     Time reflects when diagnosis was documented in both MDM as applicable and the Disposition within this note     Time User Action Codes Description Comment    8/3/2022 10:20 AM Neo Benitez [K04 7] Dental infection       ED Disposition     ED Disposition   Discharge    Condition   Good    Date/Time   Wed Aug 3, 2022 10:20 AM    Comment   Tara Rosales discharge to home/self care  Follow-up Information     Follow up With Specialties Details Why [address]  Maxillofacial Surgery  if needed Italo Velarde [facility]            Discharge Medication List as of 8/3/2022 10:23 AM      START taking these medications    Details   chlorhexidine (PERIDEX) 0 12 % solution Apply 15 mL to the mouth or throat 2 (two) times a day, Starting Wed 8/3/2022, Normal      !! penicillin V potassium (VEETID) 500 mg tablet Take 1 tablet (500 mg total) by mouth 4 (four) times a day for 7 days, Starting Wed 8/3/2022, Until Wed 8/10/2022, Normal       !! - Potential duplicate medications found  Please discuss with provider        CONTINUE these medications which have NOT CHANGED    Details   naproxen (NAPROSYN) 500 mg tablet Take 1 tablet (500 mg total) by mouth 2 (two) times a day with meals, Starting Sun 9/8/2019, Print      !! penicillin V potassium (VEETID) 500 mg tablet Take 1 tablet (500 mg total) by mouth 4 (four) times a day for 7 days, Starting Tue 8/2/2022, Until Tue 8/9/2022, Normal       !! - Potential duplicate medications found  Please discuss with provider  No discharge procedures on file  PDMP Review       Value Time User    PDMP Reviewed  Yes 8/4/2022 10:05 AM Adeola Deras DDS           ED Provider  Attending physically available and evaluated Elie Plunkett  I managed the patient along with the ED Attending      Electronically Signed by         Fred Canales DO  08/04/22 2007

## 2022-08-03 NOTE — CASE MANAGEMENT
Case Management ED Assessment    Patient name Judson Muñoz  Location ED 01/ED 01 MRN [id number]  : 1985 Date 8/3/2022        OBJECTIVE:  Predictive Model Details         34% Factor Value    Risk of Hospital Admission or ED Visit Model Number of ED Visits 2     Is in Relationship No       Chief Complaint: Chronic dental pain   Patient Class: Emergency  Preferred Pharmacy:   [address]  [address] [address]  Phone: [phone] Fax: [phone]    [id number] [place] [address]  Phone: [phone] Fax: [phone]    Primary Care Provider: No primary care provider on file  Primary Insurance:   Secondary Insurance:     ED ASSESSMENT:  Active Health Care Proxies    There are no active Health Care Proxies on file          Outpatient Care Information  Have you seen a doctor in the last year?: No  What barriers (if any) have you encountered getting to scheduled follow-up appointments?: No insurance    Prescribed Medications Prior to Admission/ED Visit  Has patient been prescribed medications (prior to this ED visit/admission)?: Yes  Any difficulty obtaining medications?: Yes  Has the patient been taking all prescribed medication(s)?: No  Does the patient have difficulty affording medication(s)?: No    Patient Information  Admitted from[de-identified] Home  Mental Status: Alert  During Assessment patient was accompanied by: Spouse  Assessment information provided by[de-identified] Patient  Primary Caregiver: Self  Support Systems: Self, Spouse/significant other  [place] of Residence: [address] [place] [address] do you live in?: [facility]  In the last 12 months, was there a time when you were not able to pay the mortgage or rent on time?: No  In the last 12 months, how many places have you lived?: 1  In the last 12 months, was there a time when you did not have a steady place to sleep or slept in a shelter (including now)?: No  Homeless/housing insecurity resource given?: No  Living Arrangements: Lives w/ Spouse/significant other    Patient Information Continued  Income Source: Employed  Does patient have prescription coverage?: No  Within the past 12 months, you worried that your food would run out before you got the money to buy more : Never true  Within the past 12 months, the food you bought just didn't last and you didn't have money to get more : Never true  Food insecurity resource given?: No  Does patient receive dialysis treatments?: No    Food and Transportation  What is patient's usual means of transportation?: Public transport/bus  Ask patient:  How would you describe your eating habits?: Good

## 2022-08-03 NOTE — ED ATTENDING ATTESTATION
8/2/2022  I, Phoebe Hardy DO, saw and evaluated the patient  I have discussed the patient with the resident/non-physician practitioner and agree with the resident's/non-physician practitioner's findings, Plan of Care, and MDM as documented in the resident's/non-physician practitioner's note, except where noted  All available labs and Radiology studies were reviewed  I was present for key portions of any procedure(s) performed by the resident/non-physician practitioner and I was immediately available to provide assistance  At this point I agree with the current assessment done in the Emergency Department  I have conducted an independent evaluation of this patient a history and physical is as follows:    31-year-old male presents for dentalgia  Right lower tooth pain  Onset was yesterday  No fracture no systemic symptoms  He has poor dentition  Does not follow regularly with a dentist   On exam is no identifiable abscess although does have some swelling her apically  No tooth fracture  No trismus    Plan dental block antibiotics follow up with dental clinic    ED Course         Critical Care Time  Procedures

## 2022-08-03 NOTE — CASE MANAGEMENT
Case Management ED Discharge Planning Note    Patient name [place]  Location ED /ED 01 MRN [id number]  : 1985 Date 8/3/2022        OBJECTIVE:  Predictive Model Details         34% Factor Value    Risk of Hospital Admission or ED Visit Model Number of ED Visits 2     Is in Relationship No          Chief Complaint: Chronic dental pain   Patient Class: Emergency  Preferred Pharmacy:   [address] [place] [address]  [address] [place] [id number]  Phone: [phone] Fax: [phone]    [id number] [place] [address]  Phone: [phone] Fax: [phone]    Primary Care Provider: No primary care provider on file  Primary Insurance:   Secondary Insurance:     ED Discharge Details:    Discharge planning discussed with[de-identified] patient  Freedom of Choice: Yes     CM contacted family/caregiver?: No- see comments (Pt alert and oriented)  Were Treatment Team discharge recommendations reviewed with patient/caregiver?: Yes  Did patient/caregiver verbalize understanding of patient care needs?: Yes  Were patient/caregiver advised of the risks associated with not following Treatment Team discharge recommendations?: Yes    Other Referral/Resources/Interventions Provided:  Interventions: Other (Specify) (dentist)    Treatment Team Recommendation: Home  Discharge Destination Plan[de-identified] Home     Transport at Discharge : Self     Follow-up Appointment Information  Follow up appointment scheduled with[de-identified] [facility]  Follow up appointments scheduled for the following [facility][de-identified] Other, please specify in comment box   (dental ( at 8:30))

## 2022-08-03 NOTE — DISCHARGE INSTRUCTIONS
You have been seen for a dental infection  You should return to the ED if you develop high fevers, vomiting, inability to swallow, trouble breathing, trouble opening her mouth, or other worsening symptoms  Follow up with the dental clinic  Take penicillin as directed  Use Peridex mouthwash  Use medications such as Advil, Motrin, or Aleve for pain

## 2022-08-04 ENCOUNTER — OFFICE VISIT (OUTPATIENT)
Dept: DENTISTRY | Facility: CLINIC | Age: 37
End: 2022-08-04

## 2022-08-04 VITALS — SYSTOLIC BLOOD PRESSURE: 172 MMHG | HEART RATE: 88 BPM | TEMPERATURE: 97.8 F | DIASTOLIC BLOOD PRESSURE: 127 MMHG

## 2022-08-04 DIAGNOSIS — K04.7 TOOTH ABSCESS: Primary | ICD-10-CM

## 2022-08-04 PROCEDURE — D0140 LIMITED ORAL EVALUATION - PROBLEM FOCUSED: HCPCS | Performed by: DENTIST

## 2022-08-04 RX ORDER — OXYCODONE HYDROCHLORIDE AND ACETAMINOPHEN 5; 325 MG/1; MG/1
1 TABLET ORAL EVERY 4 HOURS PRN
Qty: 12 TABLET | Refills: 0 | Status: SHIPPED | OUTPATIENT
Start: 2022-08-04

## 2022-08-04 RX ORDER — CLINDAMYCIN HYDROCHLORIDE 300 MG/1
300 CAPSULE ORAL 4 TIMES DAILY
Qty: 28 CAPSULE | Refills: 0 | Status: SHIPPED | OUTPATIENT
Start: 2022-08-04 | End: 2022-08-11

## 2022-08-04 NOTE — PROGRESS NOTES
Limited Oral Evaluation    Shannan Prado presents to [facility] for emergency appointment  Pt states he is in 10/10 pain and swelling  He states he went to the ED for the dental infection and was prescribed penicillin V  I&D attempted at ED, but no drainage occurred according to pt  Tiesha 2, patient denies any changes  Obtained a direct and personal consent  Risks and complications were explained  Explained to pt that with a large infection like his, it is difficult to achieve profound anesthesia and he will most likely feel pain during procedure  Provided pt with option of trying to extract #30 or taking antibiotics and waiting for swelling to go down  Pt wanted to try to extract tooth  Pt agreed and consented  Consent scanned in [facility] center  PAN taken  #30 root tips with PAP noted  Clinically facial swelling seen and large abscess on buccal of tooth #30  Administered 1 carpule of 2 % Lidocaine w/ 1:100,000 epi via RASHEL block  Administered 1 5 carpules of 4% articaine with 1:100,000 epi via long buccal and buccal infiltration  Slight anesthesia achieved, tried to reflect gingiva and elevate, but adequate anesthesia not achieved to continue with procedure  Abscess drained during procedure  Pt instructed to bite on gauze  Pt was in significant pain and was sweating and shaking  Pt states he sweats all the time  Procedure was stopped as pts BP was rising  Pt placed in Trendelenburg position and ice packs were used  Pt stated he felt fine besides the extreme pain  Explained to pt to achieve adequate anesthesia we need the swelling to go down  Instructed pt to take antibiotics and return to clinic after course of antibiotics  Upon dismissal, patient received POI, gauze, and RX:   Clindamycin 300 mg (for 7 days)  Percocet (12 tablets)    Pt was not alone and had his girlfriend drive  NV: Ext   #30

## (undated) DEVICE — GLOVE SRG BIOGEL 6.5

## (undated) DEVICE — STERILE BETHLEHEM PLASTIC HAND: Brand: CARDINAL HEALTH

## (undated) DEVICE — DRAPE KIT C-ARM W/PLATE PRTC FOOT SWITCH COVER

## (undated) DEVICE — IMPERVIOUS STOCKINETTE: Brand: DEROYAL

## (undated) DEVICE — INTENDED FOR TISSUE SEPARATION, AND OTHER PROCEDURES THAT REQUIRE A SHARP SURGICAL BLADE TO PUNCTURE OR CUT.: Brand: BARD-PARKER ® CARBON RIB-BACK BLADES

## (undated) DEVICE — GAUZE SPONGES,16 PLY: Brand: CURITY

## (undated) DEVICE — SPLINT 3 X 15 IN XFAST SET PLASTER

## (undated) DEVICE — 2.0 CORTICAL SCREW 14MM, HD6, 1/PKG
Type: IMPLANTABLE DEVICE | Site: HAND | Status: NON-FUNCTIONAL
Brand: APTUS
Removed: 2019-09-24

## (undated) DEVICE — 3M™ STERI-STRIP™ REINFORCED ADHESIVE SKIN CLOSURES, R1547, 1/2 IN X 4 IN (12 MM X 100 MM), 6 STRIPS/ENVELOPE: Brand: 3M™ STERI-STRIP™

## (undated) DEVICE — PADDING CAST 4 IN  COTTON STRL

## (undated) DEVICE — TWIST DRILL Ø2.1MM X 10MM, L66MM, AO: Brand: APTUS

## (undated) DEVICE — SUT MONOCRYL 4-0 PS-2 18 IN Y496G

## (undated) DEVICE — TWIST DRILL Ø1.6MM X 25MM, L81MM, AO: Brand: APTUS

## (undated) DEVICE — ADHESIVE SKN CLSR HISTOACRYL FLEX 0.5ML LF

## (undated) DEVICE — CHLORAPREP HI-LITE 26ML ORANGE

## (undated) DEVICE — CUFF TOURNIQUET 18 X 4 IN QUICK CONNECT DISP 1 BLADDER

## (undated) DEVICE — TUBING SUCTION 5MM X 12 FT

## (undated) DEVICE — GLOVE INDICATOR PI UNDERGLOVE SZ 7 BLUE

## (undated) DEVICE — STRETCH BANDAGE: Brand: CURITY

## (undated) DEVICE — GLOVE SRG BIOGEL 7

## (undated) DEVICE — LIGHT HANDLE COVER SLEEVE DISP BLUE STELLAR

## (undated) DEVICE — SUT CHROMIC 5-0 P-3 18 IN 687G

## (undated) DEVICE — GLOVE INDICATOR PI UNDERGLOVE SZ 6.5 BLUE

## (undated) DEVICE — ACE WRAP 3 IN STERILE

## (undated) DEVICE — SPONGE PVP SCRUB WING STERILE

## (undated) DEVICE — DISPOSABLE EQUIPMENT COVER: Brand: SMALL TOWEL DRAPE

## (undated) DEVICE — SUT VICRYL 2-0 SH 27 IN UNDYED J417H

## (undated) DEVICE — 2.0/2.3 COUNTERSINK F CORT.SCREWS,DENTAL: Brand: APTUS

## (undated) DEVICE — SUT VICRYL 3-0 SH 27 IN J416H

## (undated) DEVICE — MINI BLADE ROUND TIP SHARP ON ONE SIDE